# Patient Record
Sex: FEMALE | URBAN - METROPOLITAN AREA
[De-identification: names, ages, dates, MRNs, and addresses within clinical notes are randomized per-mention and may not be internally consistent; named-entity substitution may affect disease eponyms.]

---

## 2024-10-17 ENCOUNTER — APPOINTMENT (RX ONLY)
Dept: URBAN - METROPOLITAN AREA CLINIC 86 | Facility: CLINIC | Age: 39
Setting detail: DERMATOLOGY
End: 2024-10-17

## 2024-10-17 VITALS — HEIGHT: 63 IN | WEIGHT: 140 LBS

## 2024-10-17 DIAGNOSIS — L01.01 NON-BULLOUS IMPETIGO: ICD-10-CM

## 2024-10-17 PROCEDURE — ? PRESCRIPTION

## 2024-10-17 PROCEDURE — ? PHOTO-DOCUMENTATION

## 2024-10-17 PROCEDURE — ? COUNSELING

## 2024-10-17 PROCEDURE — ? TREATMENT REGIMEN

## 2024-10-17 PROCEDURE — 99203 OFFICE O/P NEW LOW 30 MIN: CPT

## 2024-10-17 RX ORDER — CEPHALEXIN 500 MG/1
CAPSULE ORAL
Qty: 14 | Refills: 0 | Status: ERX | COMMUNITY
Start: 2024-10-17

## 2024-10-17 RX ORDER — MUPIROCIN 20 MG/G
OINTMENT TOPICAL
Qty: 22 | Refills: 1 | Status: ERX | COMMUNITY
Start: 2024-10-17

## 2024-10-17 RX ADMIN — CEPHALEXIN: 500 CAPSULE ORAL at 00:00

## 2024-10-17 RX ADMIN — MUPIROCIN: 20 OINTMENT TOPICAL at 00:00

## 2024-10-17 ASSESSMENT — LOCATION DETAILED DESCRIPTION DERM: LOCATION DETAILED: LEFT NASAL SIDEWALL

## 2024-10-17 ASSESSMENT — LOCATION ZONE DERM: LOCATION ZONE: NOSE

## 2024-10-17 ASSESSMENT — LOCATION SIMPLE DESCRIPTION DERM: LOCATION SIMPLE: LEFT NOSE

## 2024-10-17 NOTE — HPI: SKIN LESION
What Type Of Note Output Would You Prefer (Optional)?: Bullet Format
How Severe Is Your Skin Lesion?: mild
Has Your Skin Lesion Been Treated?: not been treated
Is This A New Presentation, Or A Follow-Up?: Skin Lesion
Additional History: New patient is here for an evaluation of her non healing nose piercing she states she got her piercing done about a year ago and thr piercing is not healing properly. She reports to clean the wound twice a day with mild soap and wound wash. Pt reports that this is her fourth nose piercing. She had a smaller stud with titanium metal but developed irritation with this piercing and had to exchange for this new piercing, now with gold bc she was worried that she has an allergy to the metal. She has used OTC HC cream which helped temporarily. She is also using a saline wash.

## 2024-10-17 NOTE — PROCEDURE: TREATMENT REGIMEN
Initiate Treatment: Mupirocin QD-BID; cephalexin 500mg BID x 7 days
Detail Level: Simple
Otc Regimen: vinegar water 1 tbsp in 1-2 cup of water QD-BID
Plan: Patient states she has changed her nose piercing to rule out allergic reaction to titanium, however she states even after changing her piercing to gold she continued to experience infections, patient will begin oral antibiotic cephalexin with topical mupirocin. Discussed with the pt that sometimes having a foreign body in the skin can lead to recurrent infections and the only way to prevent the infection is to remove the foreign body. Will re-evaluate in 2 weeks if any changes need to be made.